# Patient Record
Sex: FEMALE | Race: WHITE
[De-identification: names, ages, dates, MRNs, and addresses within clinical notes are randomized per-mention and may not be internally consistent; named-entity substitution may affect disease eponyms.]

---

## 2017-03-15 ENCOUNTER — HOSPITAL ENCOUNTER (OUTPATIENT)
Dept: HOSPITAL 45 - C.MAMM | Age: 47
Discharge: HOME | End: 2017-03-15
Attending: INTERNAL MEDICINE
Payer: COMMERCIAL

## 2017-03-15 DIAGNOSIS — Z12.31: Primary | ICD-10-CM

## 2017-03-15 NOTE — MAMMOGRAPHY REPORT
BILATERAL DIGITAL SCREENING MAMMOGRAM TOMOSYNTHESIS WITH CAD: 3/15/2017

CLINICAL HISTORY: Routine screening.  Patient has no complaints.  





TECHNIQUE:  Breast tomosynthesis in addition to standard 2D mammography was performed. Current study
 was also evaluated with a Computer Aided Detection (CAD) system.  



COMPARISON: Comparison is made to exams dated:  3/14/2016 mammogram, 2/6/2014 mammogram, 3/13/2015 m
ammogram, 1/31/2013 mammogram, 1/26/2012 mammogram, and 1/25/2011 mammogram - [facility].   



BREAST COMPOSITION:  The tissue of both breasts is almost entirely fatty.  



FINDINGS:  There are scattered stable benign-appearing calcifications in the breasts.  Stable intram
ammary lymph nodes in each upper outer quadrant.  No new suspicious mass, architectural distortion o
r cluster of microcalcifications is seen.  



IMPRESSION:  ACR BI-RADS CATEGORY 1: NEGATIVE

There is no mammographic evidence of malignancy. A 1 year screening mammogram is recommended.  The p
atient will receive written notification of the results.  





Approximately 10% of breast cancers are not detected with mammography. A negative mammographic repor
t should not delay biopsy if a clinically suggestive mass is present.



Nessa Robles M.D.          

ay/:3/15/2017 15:03:12  



Imaging Technologist: Samantha FERMIN(R)(M), [facility]

letter sent: Normal 1/2  

BI-RADS Code: ACR BI-RADS Category 1: Negative

## 2018-03-19 ENCOUNTER — HOSPITAL ENCOUNTER (OUTPATIENT)
Dept: HOSPITAL 45 - C.MAMM | Age: 48
Discharge: HOME | End: 2018-03-19
Attending: INTERNAL MEDICINE
Payer: COMMERCIAL

## 2018-03-19 DIAGNOSIS — Z12.31: Primary | ICD-10-CM

## 2018-03-20 NOTE — MAMMOGRAPHY REPORT
BILATERAL DIGITAL SCREENING MAMMOGRAM TOMOSYNTHESIS WITH CAD: 3/19/2018

CLINICAL HISTORY: Routine screening.  Patient has no complaints.  





TECHNIQUE:  Breast tomosynthesis in addition to standard 2D mammography was performed. Current study 
was also evaluated with a Computer Aided Detection (CAD) system.  



COMPARISON: Comparison is made to exams dated:  3/14/2016 mammogram, 3/13/2015 mammogram, 2/6/2014 ma
mmogram, 1/31/2013 mammogram, 1/26/2012 mammogram, and 1/25/2011 mammogram - [facility]
nter.   



BREAST COMPOSITION:  The tissue of both breasts is almost entirely fatty.  



FINDINGS:  No suspicious masses, calcifications, or areas of architectural distortion are noted in ei
ther breast. There has been no significant interval change compared to prior exams.  



IMPRESSION:  ACR BI-RADS CATEGORY 1: NEGATIVE

There is no mammographic evidence of malignancy. A 1 year screening mammogram is recommended.  The pa
tient will receive written notification of the results.  





Approximately 10% of breast cancers are not detected with mammography. A negative mammographic report
 should not delay biopsy if a clinically suggestive mass is present.



Bisi Ruiz M.D.          

/:3/19/2018 15:34:01  



Imaging Technologist: Minnie FERMIN(TARAS)(M), [facility]

letter sent: Normal 1/2  

BI-RADS Code: ACR BI-RADS Category 1: Negative

## 2018-07-26 ENCOUNTER — HOSPITAL ENCOUNTER (OUTPATIENT)
Dept: HOSPITAL 45 - C.LAB | Age: 48
Discharge: HOME | End: 2018-07-26
Attending: INTERNAL MEDICINE
Payer: COMMERCIAL

## 2018-07-26 DIAGNOSIS — Z00.00: Primary | ICD-10-CM

## 2018-07-26 DIAGNOSIS — E66.9: ICD-10-CM

## 2018-07-26 DIAGNOSIS — Z98.84: ICD-10-CM

## 2018-07-26 DIAGNOSIS — M77.10: ICD-10-CM

## 2018-07-26 LAB
KETONES UR QL STRIP: 109 MG/DL
PH UR: 186 MG/DL (ref 0–200)